# Patient Record
Sex: FEMALE | Race: WHITE | NOT HISPANIC OR LATINO | Employment: UNEMPLOYED | URBAN - METROPOLITAN AREA
[De-identification: names, ages, dates, MRNs, and addresses within clinical notes are randomized per-mention and may not be internally consistent; named-entity substitution may affect disease eponyms.]

---

## 2021-07-14 ENCOUNTER — OFFICE VISIT (OUTPATIENT)
Dept: OBSTETRICS AND GYNECOLOGY | Facility: CLINIC | Age: 66
End: 2021-07-14

## 2021-07-14 VITALS
DIASTOLIC BLOOD PRESSURE: 80 MMHG | WEIGHT: 133 LBS | HEIGHT: 60 IN | SYSTOLIC BLOOD PRESSURE: 118 MMHG | BODY MASS INDEX: 26.11 KG/M2

## 2021-07-14 DIAGNOSIS — R93.5 ABNORMAL CT SCAN, PELVIS: Primary | ICD-10-CM

## 2021-07-14 PROCEDURE — 99203 OFFICE O/P NEW LOW 30 MIN: CPT | Performed by: OBSTETRICS & GYNECOLOGY

## 2021-07-14 RX ORDER — ATORVASTATIN CALCIUM 40 MG/1
40 TABLET, FILM COATED ORAL DAILY
COMMUNITY
Start: 2021-06-26

## 2021-07-14 RX ORDER — FLUTICASONE PROPIONATE AND SALMETEROL 232; 14 UG/1; UG/1
1 POWDER, METERED RESPIRATORY (INHALATION) 2 TIMES DAILY
COMMUNITY
Start: 2021-05-13

## 2021-07-14 RX ORDER — FLUTICASONE PROPIONATE 50 MCG
1 SPRAY, SUSPENSION (ML) NASAL DAILY
COMMUNITY
Start: 2021-05-25

## 2021-07-14 RX ORDER — PANTOPRAZOLE SODIUM 40 MG/1
40 TABLET, DELAYED RELEASE ORAL
COMMUNITY
Start: 2021-05-05 | End: 2021-08-03

## 2021-07-14 RX ORDER — HYDROCHLOROTHIAZIDE 12.5 MG/1
12.5 CAPSULE, GELATIN COATED ORAL DAILY
COMMUNITY
Start: 2021-06-18

## 2021-07-14 RX ORDER — PAROXETINE HYDROCHLORIDE 40 MG/1
40 TABLET, FILM COATED ORAL DAILY
COMMUNITY
Start: 2021-06-15

## 2021-07-14 RX ORDER — ASPIRIN 81 MG/1
81 TABLET ORAL DAILY
COMMUNITY

## 2021-07-14 RX ORDER — ALBUTEROL SULFATE 90 UG/1
1 AEROSOL, METERED RESPIRATORY (INHALATION) EVERY 4 HOURS PRN
COMMUNITY
Start: 2021-07-10

## 2021-07-14 NOTE — PROGRESS NOTES
Subjective    Chief Complaint   Patient presents with   • Gynecologic Exam     New pt referred by PCP, left side pain       History of Present Illness    Kyung Elias is a 66 y.o. female who presents for left-sided pain and questionable thickening or fluid of endocervical canal seen on CT scan.  Abdominal ultrasound could not see uterus or ovaries and patient was unable to tolerate vaginal ultrasound as was very painful.  Patient having absolutely no vaginal bleeding.  She has not had a Pap smear for 3 to 4 years at least.  She has had no previous pelvic surgery except for D&Cs many years ago.  The left-sided pain she actually describes is not really pelvic but more left lower back radiating to her flank.  Evaluation of that so far by her other physicians has been negative..    Obstetric History:  OB History    No obstetric history on file.        Menstrual History:     No LMP recorded.       Past Medical History:   Diagnosis Date   • Acid reflux    • Asthma    • COPD (chronic obstructive pulmonary disease) (CMS/[facility])    • Diabetes (CMS/[facility])    • High blood pressure    • High cholesterol      History reviewed. No pertinent family history.    The following portions of the patient's history were reviewed and updated as appropriate: allergies, current medications, past family history, past medical history, past social history, past surgical history and problem list.    Review of Systems   Constitutional: Negative.  Negative for fever and unexpected weight change.   HENT: Negative.    Respiratory: Negative for shortness of breath and wheezing.    Cardiovascular: Negative for chest pain, palpitations and leg swelling.   Gastrointestinal: Positive for abdominal pain. Negative for anal bleeding and blood in stool.   Genitourinary: Negative for dysuria, pelvic pain, urgency, vaginal bleeding, vaginal discharge and vaginal pain.   Musculoskeletal: Positive for back pain.   Skin: Negative.    Neurological: Negative.   "  Hematological: Negative.  Negative for adenopathy.   Psychiatric/Behavioral: Negative.  Negative for dysphoric mood. The patient is not nervous/anxious.             Objective   Physical Exam  Constitutional:       Appearance: Normal appearance.   Abdominal:      General: Abdomen is flat. There is no distension.      Palpations: Abdomen is soft. There is no mass.      Tenderness: There is no abdominal tenderness. There is no guarding.      Hernia: There is no hernia in the left inguinal area or right inguinal area.   Genitourinary:     General: Normal vulva.      Labia:         Right: No rash, tenderness or lesion.         Left: No rash, tenderness or lesion.       Urethra: No prolapse.      Uterus: Normal.       Adnexa:         Right: No mass or tenderness.          Left: No mass or tenderness.        Rectum: Normal.      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  Cervix not seen today.  No bleeding present prior to exam.  Neurological:      Mental Status: She is alert.         /80   Ht 152.4 cm (60\")   Wt 60.3 kg (133 lb)   BMI 25.97 kg/m²     Assessment/Plan   Diagnoses and all orders for this visit:    1. Abnormal CT scan, pelvis (Primary)        Impression and plan.   30-minute visit today of which 20 minutes was face-to-face counseling going over her previous history and scans and describing her physical findings.  What we have at this point is a questionable finding of fluid around the endocervical canal on CT scan with no evidence of bleeding or any other symptoms.  Previous ultrasound vaginally was unsuccessful and painful due to the fact that the superior portion of the vagina is very stenotic and will not introduce a probe.  I feel we should get an ultrasound in our office abdominally and possibly a limited vaginal ultrasound only entering the vagina approximately 1 to 2 inches to see what can be " visualized that way.  I will also use a pediatric speculum at her other office to see through the narrow vagina and see if cervix is visualized and a Pap smear can be performed.  Due to the patient's medical problems and lack of any symptoms or tangible findings at this time.,  I do not feel we are looking at surgery such as a D&C for further evaluation unless something else is found on subsequent ultrasound.  Patient is happy with this plan.

## 2021-08-04 ENCOUNTER — OFFICE VISIT (OUTPATIENT)
Dept: OBSTETRICS AND GYNECOLOGY | Facility: CLINIC | Age: 66
End: 2021-08-04

## 2021-08-04 VITALS
WEIGHT: 133 LBS | HEIGHT: 60 IN | BODY MASS INDEX: 26.11 KG/M2 | SYSTOLIC BLOOD PRESSURE: 114 MMHG | DIASTOLIC BLOOD PRESSURE: 70 MMHG

## 2021-08-04 DIAGNOSIS — R93.89 ABNORMAL CT SCAN: Primary | ICD-10-CM

## 2021-08-04 PROCEDURE — 99213 OFFICE O/P EST LOW 20 MIN: CPT | Performed by: OBSTETRICS & GYNECOLOGY

## 2021-08-04 RX ORDER — ESOMEPRAZOLE MAGNESIUM 40 MG/1
CAPSULE, DELAYED RELEASE ORAL
COMMUNITY
Start: 2021-07-12

## 2021-08-04 NOTE — PROGRESS NOTES
"Subjective    Chief Complaint   Patient presents with   • Follow-up     Discuss u/s      History of Present Illness    Kyung Elias is a 66 y.o. female who presents for follow-up of questionable finding on CT scan of fluid around the endocervical canal.  Patient has severe vaginal stenosis making previous vaginal ultrasound in the hospital difficult.  Ultrasound today performed in our office revealed normal uterus and cervix with no fluid or lesion seen.  Patient has no vaginal bleeding.  No current pelvic pain.    Obstetric History:  OB History    No obstetric history on file.        Menstrual History:     No LMP recorded.       Past Medical History:   Diagnosis Date   • Acid reflux    • Asthma    • COPD (chronic obstructive pulmonary disease) (CMS/[facility])    • Diabetes (CMS/[facility])    • High blood pressure    • High cholesterol      History reviewed. No pertinent family history.    The following portions of the patient's history were reviewed and updated as appropriate: allergies, current medications, past medical history, past surgical history and problem list.    Review of Systems  Negative for pelvic pain or bleeding       Objective   Physical Exam  Severe vaginal stenosis at the top of the vagina too stenotic to place a speculum through it.  Nevertheless did do a Pap of that area looking for any abnormal cells.  /70   Ht 152.4 cm (60\")   Wt 60.3 kg (133 lb)   BMI 25.97 kg/m²     Assessment/Plan   Diagnoses and all orders for this visit:    1. Abnormal CT scan (Primary)  -     IGP,rfx Aptima HPV All Pth        Impression and plan.  Long discussion with the patient concerning possible small amount of fluid around her cervix or endocervix.  Discussed doing a Pap smear which we performed and we will have back in 2 weeks looking for any abnormal cells.  If nothing specific on Pap, since patient is not having any bleeding or pain or other symptoms, certainly do not feel we need to do surgery as she absolutely " does not want it and we will be looking at a possible hysterectomy as D&C would be impossible to perform.  Did offer MRI or repeat CT scan in 6 months but patient happy with just returning if she has any vaginal bleeding in the future after the results of her Pap are known and I have no problem with this plan.

## 2021-08-06 LAB
CONV .: NORMAL
CYTOLOGIST CVX/VAG CYTO: NORMAL
CYTOLOGY CVX/VAG DOC CYTO: NORMAL
CYTOLOGY CVX/VAG DOC THIN PREP: NORMAL
DX ICD CODE: NORMAL
HIV 1 & 2 AB SER-IMP: NORMAL
OTHER STN SPEC: NORMAL
STAT OF ADQ CVX/VAG CYTO-IMP: NORMAL